# Patient Record
Sex: FEMALE | Race: BLACK OR AFRICAN AMERICAN
[De-identification: names, ages, dates, MRNs, and addresses within clinical notes are randomized per-mention and may not be internally consistent; named-entity substitution may affect disease eponyms.]

---

## 2018-12-22 ENCOUNTER — HOSPITAL ENCOUNTER (OUTPATIENT)
Dept: HOSPITAL 35 - SLEEPLAB | Age: 63
End: 2018-12-22
Attending: INTERNAL MEDICINE
Payer: COMMERCIAL

## 2018-12-22 DIAGNOSIS — R09.02: ICD-10-CM

## 2018-12-22 DIAGNOSIS — G47.33: Primary | ICD-10-CM

## 2019-01-02 NOTE — SLE
[facility]
[place]   [address]                     POLYSOMNOGRAPHY STUDY         
_______________________________________________________________________________
 
Name:       MARIS BOBO          Room #:                     REG [facility]#:      [id number]                       Account #:      [id number]  
Admission:  12/22/18    Attend Phys:    Clement Soalno MD      
Discharge:              Date of Birth:  [birth year]  
                                                          Report #: [id number]
                                                                    [id number]   
_______________________________________________________________________________
THIS REPORT FOR:   //name//                          
 
CC: Clement Nick MD
 
DATE OF SERVICE:  12/22/2018
 
 
ATTENDING PHYSICIAN:  Dr. Neil Nick.
 
The patient is 63 years old, who weighs 220 pounds with a BMI of 36.6.  The
patient's De Witt score was 14.  The patient underwent split night study at [facility].
 
During the night of study, the patient spent 462 minutes in bed and slept for
419 minutes with a sleep efficiency of 91%.  Sleep latency was 5.2 minutes with
a REM latency of 55.7 minutes.  Overall, sleep architecture showed normal stage
1 sleep, increased stage 2 sleep, absent N3 sleep and slightly reduced REM
sleep, which was 15% of the total sleep time.
 
During the initial diagnostic portion of the study, the patient spent 212
minutes in bed and slept for 193 minutes.  During that time, there were 5
obstructive apneas, no mixed or central apneas.  There were 108 hypopneas.  The
patient's apnea-hypopnea index was 35 per hour with a REM index of 65 per hour
and a supine index of 35 per hour.
 
EKG monitoring revealed an average heart rate of 62 beats per minute with a
maximum 84 beats per minute, no sustained arrhythmias observed.
 
No clinically significant PLMS observed.
 
Nocturnal oximetry study revealed an average oxygen saturation 93% with a lowest
of 81%.  17 minutes were spent in oxygen saturation of less than 89%.
 
The patient met the criteria for a split night study.  The patient was started
on CPAP at 5 cm water and titrated up to 9 cm water.  At the final pressure, the
patient slept for 113 minutes.  The patient in supine as well as REM sleep.  The
patient's AHI was reduced to 3.7 per hour and oxygen saturations remained above
90%.
 
IMPRESSION:
1.  Severe sleep apnea-hypopnea syndrome at an AHI of 35 per hour with a REM AHI
of 65 per hour.
2.  Nocturnal hypoxia secondary to obstructive sleep apnea, but resolved with
CPAP.
 
 
 
[facility]
[address] [place]   [address]                     POLYSOMNOGRAPHY STUDY         
_______________________________________________________________________________
 
Name:       WENDY ALBERT GAMINOH          Room #:                     REG [facility].#:      [id number]                       Account #:      [id number]  
Admission:  12/22/18    Attend Phys:    Clement Solano MD      
Discharge:              Date of Birth:  [birth year]  
                                                          Report #: [id number]
                                                                    [id number]   
_______________________________________________________________________________
3.  No clinically significant periodic limb movements.
 
RECOMMENDATIONS:
1.  CPAP at 9 cm water completely eliminated the patient's sleep apnea and
should be used on a nightly basis.
2.  Follow up in 4-6 weeks to assess compliance with CPAP and to document
clinical improvement.
3.  Weight loss is strongly advised.
4.  Avoid CNS depressants.
5.  Cautioned regarding driving until symptoms of sleep apnea resolve with the
use of CPAP.
 
 
 
 
 
 
 
 
 
 
 
 
 
 
 
 
 
 
 
 
 
 
 
 
 
 
 
 
 
 
 
 
 
  <ELECTRONICALLY SIGNED>
   By: Clement Solano MD              
  01/02/19     0427
D: 01/02/19 0221                           _____________________________________
T: 01/02/19 0306                           Clement Solano MD                /nt

## 2020-10-18 ENCOUNTER — HOSPITAL ENCOUNTER (EMERGENCY)
Dept: HOSPITAL 35 - ER | Age: 65
Discharge: HOME | End: 2020-10-18
Payer: COMMERCIAL

## 2020-10-18 VITALS — DIASTOLIC BLOOD PRESSURE: 85 MMHG | SYSTOLIC BLOOD PRESSURE: 165 MMHG

## 2020-10-18 VITALS — HEIGHT: 65 IN | WEIGHT: 239 LBS | BODY MASS INDEX: 39.82 KG/M2

## 2020-10-18 DIAGNOSIS — Z79.899: ICD-10-CM

## 2020-10-18 DIAGNOSIS — J45.909: ICD-10-CM

## 2020-10-18 DIAGNOSIS — Z91.048: ICD-10-CM
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
## 2020-10-18 DIAGNOSIS — S61.412A: Primary | ICD-10-CM
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
## 2020-10-18 DIAGNOSIS — Z88.1: ICD-10-CM

## 2020-10-18 DIAGNOSIS — I10: ICD-10-CM
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
## 2020-10-18 DIAGNOSIS — Z88.2: ICD-10-CM
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
